# Patient Record
Sex: MALE | Race: WHITE | NOT HISPANIC OR LATINO | URBAN - METROPOLITAN AREA
[De-identification: names, ages, dates, MRNs, and addresses within clinical notes are randomized per-mention and may not be internally consistent; named-entity substitution may affect disease eponyms.]

---

## 2024-04-19 ENCOUNTER — HOSPITAL ENCOUNTER (OUTPATIENT)
Dept: CARDIOLOGY | Facility: HOSPITAL | Age: 54
Discharge: HOME | End: 2024-04-19
Payer: COMMERCIAL

## 2024-04-19 DIAGNOSIS — I35.0 AORTIC VALVE STENOSIS, ETIOLOGY OF CARDIAC VALVE DISEASE UNSPECIFIED: Primary | ICD-10-CM

## 2024-04-19 DIAGNOSIS — I35.0 AORTIC STENOSIS: ICD-10-CM

## 2024-04-19 PROCEDURE — 93325 DOPPLER ECHO COLOR FLOW MAPG: CPT | Mod: REDUCED SERVICES | Performed by: INTERNAL MEDICINE

## 2024-04-19 PROCEDURE — 76376 3D RENDER W/INTRP POSTPROCES: CPT | Mod: 52

## 2024-04-19 PROCEDURE — 93321 DOPPLER ECHO F-UP/LMTD STD: CPT | Mod: REDUCED SERVICES | Performed by: INTERNAL MEDICINE

## 2024-04-19 PROCEDURE — 93308 TTE F-UP OR LMTD: CPT | Mod: REDUCED SERVICES | Performed by: INTERNAL MEDICINE

## 2024-04-19 PROCEDURE — 93356 MYOCRD STRAIN IMG SPCKL TRCK: CPT | Mod: REDUCED SERVICES | Performed by: INTERNAL MEDICINE

## 2024-04-19 PROCEDURE — 76376 3D RENDER W/INTRP POSTPROCES: CPT | Mod: REDUCED SERVICES | Performed by: INTERNAL MEDICINE

## 2024-04-19 PROCEDURE — 2500000004 HC RX 250 GENERAL PHARMACY W/ HCPCS (ALT 636 FOR OP/ED): Performed by: INTERNAL MEDICINE

## 2024-04-19 RX ADMIN — PERFLUTREN 2 ML OF DILUTION: 6.52 INJECTION, SUSPENSION INTRAVENOUS at 12:36

## 2024-04-22 LAB
AORTIC VALVE MEAN GRADIENT: 31.5 MMHG
AORTIC VALVE PEAK VELOCITY: 3.7 M/S
AV PEAK GRADIENT: 54.6 MMHG
AVA (PEAK VEL): 1.14 CM2
AVA (VTI): 1.12 CM2
EJECTION FRACTION APICAL 4 CHAMBER: 57.8
LEFT VENTRICLE INTERNAL DIMENSION DIASTOLE: 5.8 CM (ref 3.5–6)
LEFT VENTRICULAR OUTFLOW TRACT DIAMETER: 1.99 CM
LV EJECTION FRACTION BIPLANE: 56 %
MITRAL VALVE E/A RATIO: 2.3
MITRAL VALVE E/E' RATIO: 16.24
RIGHT VENTRICLE FREE WALL PEAK S': 9 CM/S
RIGHT VENTRICLE PEAK SYSTOLIC PRESSURE: 43.8 MMHG
TRICUSPID ANNULAR PLANE SYSTOLIC EXCURSION: 1.6 CM

## 2024-05-03 ENCOUNTER — APPOINTMENT (OUTPATIENT)
Dept: INTERVENTIONAL RADIOLOGY/VASCULAR | Age: 54
End: 2024-05-03
Payer: COMMERCIAL

## 2024-05-03 ENCOUNTER — APPOINTMENT (OUTPATIENT)
Dept: GENERAL RADIOLOGY | Age: 54
End: 2024-05-03
Payer: COMMERCIAL

## 2024-05-03 ENCOUNTER — HOSPITAL ENCOUNTER (EMERGENCY)
Age: 54
Discharge: HOME | End: 2024-05-03
Attending: STUDENT IN AN ORGANIZED HEALTH CARE EDUCATION/TRAINING PROGRAM
Payer: COMMERCIAL

## 2024-05-03 VITALS
OXYGEN SATURATION: 98 % | SYSTOLIC BLOOD PRESSURE: 162 MMHG | TEMPERATURE: 98.4 F | DIASTOLIC BLOOD PRESSURE: 91 MMHG | HEART RATE: 84 BPM | RESPIRATION RATE: 18 BRPM

## 2024-05-03 DIAGNOSIS — R18.8 OTHER ASCITES: Primary | ICD-10-CM

## 2024-05-03 DIAGNOSIS — R06.02 SHORTNESS OF BREATH: ICD-10-CM

## 2024-05-03 LAB
ALBUMIN SERPL-MCNC: 3.5 G/DL (ref 3.5–5.2)
ALP SERPL-CCNC: 130 U/L (ref 40–129)
ALT SERPL-CCNC: 20 U/L (ref 0–40)
ANION GAP SERPL CALCULATED.3IONS-SCNC: 8 MMOL/L (ref 7–16)
AST SERPL-CCNC: 35 U/L (ref 0–39)
BASOPHILS # BLD: 0.03 K/UL (ref 0–0.2)
BASOPHILS NFR BLD: 1 % (ref 0–2)
BILIRUB DIRECT SERPL-MCNC: 0.3 MG/DL (ref 0–0.3)
BILIRUB INDIRECT SERPL-MCNC: 0.5 MG/DL (ref 0–1)
BILIRUB SERPL-MCNC: 0.8 MG/DL (ref 0–1.2)
BUN SERPL-MCNC: 24 MG/DL (ref 6–20)
CALCIUM SERPL-MCNC: 8.5 MG/DL (ref 8.6–10.2)
CHLORIDE SERPL-SCNC: 89 MMOL/L (ref 98–107)
CO2 SERPL-SCNC: 32 MMOL/L (ref 22–29)
CREAT SERPL-MCNC: 1 MG/DL (ref 0.7–1.2)
EOSINOPHIL # BLD: 0.18 K/UL (ref 0.05–0.5)
EOSINOPHILS RELATIVE PERCENT: 5 % (ref 0–6)
ERYTHROCYTE [DISTWIDTH] IN BLOOD BY AUTOMATED COUNT: 19.7 % (ref 11.5–15)
GFR, ESTIMATED: >90 ML/MIN/1.73M2
GLUCOSE SERPL-MCNC: 150 MG/DL (ref 74–99)
HCT VFR BLD AUTO: 26 % (ref 37–54)
HGB BLD-MCNC: 7.7 G/DL (ref 12.5–16.5)
IMM GRANULOCYTES # BLD AUTO: <0.03 K/UL (ref 0–0.58)
IMM GRANULOCYTES NFR BLD: 0 % (ref 0–5)
INR PPP: 1.5
LYMPHOCYTES NFR BLD: 0.63 K/UL (ref 1.5–4)
LYMPHOCYTES RELATIVE PERCENT: 17 % (ref 20–42)
MAGNESIUM SERPL-MCNC: 2.2 MG/DL (ref 1.6–2.6)
MCH RBC QN AUTO: 27 PG (ref 26–35)
MCHC RBC AUTO-ENTMCNC: 29.6 G/DL (ref 32–34.5)
MCV RBC AUTO: 91.2 FL (ref 80–99.9)
MONOCYTES NFR BLD: 0.45 K/UL (ref 0.1–0.95)
MONOCYTES NFR BLD: 12 % (ref 2–12)
NEUTROPHILS NFR BLD: 66 % (ref 43–80)
NEUTS SEG NFR BLD: 2.48 K/UL (ref 1.8–7.3)
PLATELET # BLD AUTO: 98 K/UL (ref 130–450)
PLATELET CONFIRMATION: NORMAL
PMV BLD AUTO: 11 FL (ref 7–12)
POTASSIUM SERPL-SCNC: 4.6 MMOL/L (ref 3.5–5)
PROT SERPL-MCNC: 7.8 G/DL (ref 6.4–8.3)
PROTHROMBIN TIME: 16.7 SEC (ref 9.3–12.4)
RBC # BLD AUTO: 2.85 M/UL (ref 3.8–5.8)
SODIUM SERPL-SCNC: 129 MMOL/L (ref 132–146)
TROPONIN I SERPL HS-MCNC: 23 NG/L (ref 0–11)
TROPONIN I SERPL HS-MCNC: 26 NG/L (ref 0–11)
WBC OTHER # BLD: 3.8 K/UL (ref 4.5–11.5)

## 2024-05-03 PROCEDURE — 80053 COMPREHEN METABOLIC PANEL: CPT

## 2024-05-03 PROCEDURE — C1729 CATH, DRAINAGE: HCPCS

## 2024-05-03 PROCEDURE — 99285 EMERGENCY DEPT VISIT HI MDM: CPT

## 2024-05-03 PROCEDURE — 84484 ASSAY OF TROPONIN QUANT: CPT

## 2024-05-03 PROCEDURE — 83735 ASSAY OF MAGNESIUM: CPT

## 2024-05-03 PROCEDURE — 85025 COMPLETE CBC W/AUTO DIFF WBC: CPT

## 2024-05-03 PROCEDURE — 85610 PROTHROMBIN TIME: CPT

## 2024-05-03 PROCEDURE — 71045 X-RAY EXAM CHEST 1 VIEW: CPT

## 2024-05-03 PROCEDURE — 82248 BILIRUBIN DIRECT: CPT

## 2024-05-03 PROCEDURE — 49083 ABD PARACENTESIS W/IMAGING: CPT

## 2024-05-03 PROCEDURE — 93005 ELECTROCARDIOGRAM TRACING: CPT | Performed by: STUDENT IN AN ORGANIZED HEALTH CARE EDUCATION/TRAINING PROGRAM

## 2024-05-03 NOTE — PROGRESS NOTES
Patient came down to special procedures for ultrasound guided paracentesis.      Instructions given and questions answered. Consent signed.     Abdomen scanned and marked under the guidance of procedural Radiologist.     1522 start procedure /66 84 18 97% on 2LPM/nasal cannula.    1537 end procedure /66 86 18 98% on 2LPM/nasal cannula.     3650 cc drained of clear yellow colored ascites fluid.     Dermabond glue applied to catheter insertion site.       Dry sterile dressing of folded 4 x 4 telfa to RLQ.     Patient DSD can be removed in 24 hours.    Specimen sent to ER with patient.    Nurse to nurse called, spoke with Zenia RN, nurse notified of above information.    Patient transported back to the emergency room.

## 2024-05-03 NOTE — ED PROVIDER NOTES
deformity.      Cervical back: Normal range of motion and neck supple.   Skin:     General: Skin is warm and dry.   Neurological:      Mental Status: He is alert and oriented to person, place, and time.      Cranial Nerves: No cranial nerve deficit.      Coordination: Coordination normal.          Procedures       [facility]       ED Course as of 05/03/24 1744   Fri May 03, 2024   1355 EKG read interpreted by me.  Rate 90 bpm.  Normal axis.  Normal HI interval.  No ST elevations or depressions.  Nonspecific T wave abnormality.  No prior EKG to compare to [JM]   1627 Patient went to IR for IR drainage.  Patient stated that after getting drain he had significant improvement of his shortness of breath.  Patient otherwise clinically appears well and is requesting go home. [JM]      ED Course User Index  [MATT] Tariq Sheets MD      Patient is a 54 y.o. male presenting with abdominal distention  Patient's differential includes but is not limited to ascites, CHF, pneumonia  Tests reviewed: Patient was just admitted to trauma and was discharged home.  Patient stated that he saw his primary care provider he stated that he was stressed and Ransom for the same issue and was discharged without getting a paracentesis and stated that he saw his primary care provider told him to come into this ER to receive paracentesis.  Patient stated that his symptoms are just related to distention of his abdomen.  Patient again EKG with no acute findings.  Patient's troponins are stable.  BMP is otherwise at baseline.  Patient's CBC shows a low hemoglobin but patient is having no active bleeding.  Patient did have a paracentesis performed.  Patient stated that his symptoms resolved.  Patient stated that he feels well enough to go home.  Patient notes that he previously saw his cardiologist who told him that this is just related to his situs.  Patient will be discharged.  Discussed further workup with patient but patient stated that he is just  here for the ascites and needing to have his bili drain.  Patient has no abdominal pain but is distention.          Patient was given return precautions.  Patient will follow up with their primary care provider. Patient is agreeable to this plan. Patient has remained stable throughout their stay in the ED.             CONSULTS:   None    Medications given in the emergency room:  Medications - No data to display     LABS:    Labs Reviewed   CBC WITH AUTO DIFFERENTIAL   MAGNESIUM   PROTIME-INR   BASIC METABOLIC PANEL   HEPATIC FUNCTION PANEL   TROPONIN       As interpreted by me, the above displayed labs are abnormal. All other labs obtained during this visit were within normal range or not returned as of this dictation.    RADIOLOGY:   Non-plain film images such as CT, Ultrasound and MRI are read by the radiologist. Plain radiographic images are visualized and preliminarily interpreted by the ED Provider with the below findings:      Interpretation per the Radiologist below, if available at the time of this note:    XR CHEST PORTABLE    (Results Pending)   CT ABDOMEN PELVIS W IV CONTRAST Additional Contrast? None    (Results Pending)     No results found.    No results found.  Tariq Sheets MD            ED Course as of 05/03/24 1801   Fri May 03, 2024   1355 EKG read interpreted by me.  Rate 90 bpm.  Normal axis.  Normal [place] interval.  No ST elevations or depressions.  Nonspecific T wave abnormality.  No prior EKG to compare to [MATT]   9418 Patient went to IR for IR drainage.  Patient stated that after getting drain he had significant improvement of his shortness of breath.  Patient otherwise clinically appears well and is requesting go home. [MATT]      ED Course User Index  [MATT] Tariq Sheets MD       --------------------------------------------- PAST HISTORY ---------------------------------------------  Past Medical History:  has no past medical history on file.    Past Surgical History:  has no past surgical

## 2024-05-04 LAB
EKG ATRIAL RATE: 90 BPM
EKG P AXIS: 55 DEGREES
EKG P-R INTERVAL: 188 MS
EKG Q-T INTERVAL: 366 MS
EKG QRS DURATION: 94 MS
EKG QTC CALCULATION (BAZETT): 447 MS
EKG R AXIS: 68 DEGREES
EKG T AXIS: 40 DEGREES
EKG VENTRICULAR RATE: 90 BPM

## 2024-05-04 PROCEDURE — 93010 ELECTROCARDIOGRAM REPORT: CPT | Performed by: INTERNAL MEDICINE

## 2024-05-24 ENCOUNTER — HOSPITAL ENCOUNTER (EMERGENCY)
Age: 54
Discharge: HOME | End: 2024-05-25
Attending: STUDENT IN AN ORGANIZED HEALTH CARE EDUCATION/TRAINING PROGRAM
Payer: COMMERCIAL

## 2024-05-24 ENCOUNTER — APPOINTMENT (OUTPATIENT)
Dept: GENERAL RADIOLOGY | Age: 54
End: 2024-05-24
Payer: COMMERCIAL

## 2024-05-24 VITALS
TEMPERATURE: 97.9 F | HEART RATE: 84 BPM | OXYGEN SATURATION: 94 % | SYSTOLIC BLOOD PRESSURE: 130 MMHG | DIASTOLIC BLOOD PRESSURE: 74 MMHG | RESPIRATION RATE: 18 BRPM

## 2024-05-24 DIAGNOSIS — M25.552 LEFT HIP PAIN: Primary | ICD-10-CM

## 2024-05-24 PROCEDURE — 99283 EMERGENCY DEPT VISIT LOW MDM: CPT

## 2024-05-24 PROCEDURE — 73502 X-RAY EXAM HIP UNI 2-3 VIEWS: CPT

## 2024-05-24 PROCEDURE — 73562 X-RAY EXAM OF KNEE 3: CPT

## 2024-05-24 PROCEDURE — 73552 X-RAY EXAM OF FEMUR 2/>: CPT

## 2024-05-24 ASSESSMENT — PAIN SCALES - GENERAL: PAINLEVEL_OUTOF10: 9

## 2024-05-24 ASSESSMENT — PAIN DESCRIPTION - ORIENTATION: ORIENTATION: LEFT

## 2024-05-24 ASSESSMENT — PAIN DESCRIPTION - LOCATION: LOCATION: HIP

## 2024-05-24 ASSESSMENT — PAIN - FUNCTIONAL ASSESSMENT: PAIN_FUNCTIONAL_ASSESSMENT: 0-10

## 2024-05-25 RX ORDER — CYCLOBENZAPRINE HCL 10 MG
10 TABLET ORAL 3 TIMES DAILY PRN
Qty: 21 TABLET | Refills: 0 | Status: SHIPPED | OUTPATIENT
Start: 2024-05-25 | End: 2024-06-04

## 2024-05-25 NOTE — DISCHARGE INSTR - COC
{GREATER/LESS:[id number]} 30 days.     Update Admission H&P: {CHP DME Changes in HandP:690206682}    PHYSICIAN SIGNATURE:  {Esignature:[id number]}

## 2024-05-25 NOTE — ED PROVIDER NOTES
[facility] EMERGENCY DEPARTMENT  EMERGENCY DEPARTMENT ENCOUNTER        Pt Name: Andriy Adams  MRN: [id number]  Birthdate 1970  Date of evaluation: 5/24/2024  Provider: Susana Ann MD  PCP: Anthony Carver DO  Note Started: 11:09 PM EDT 5/24/24    CHIEF COMPLAINT       Chief Complaint   Patient presents with    Fall     Twisted left leg about  5 days ago and has been experiencing left hip pain    Hip Pain     Left       HISTORY OF PRESENT ILLNESS: 1 or more Elements   History From: Patient    Limitations to history : None    Andriy Adams is a 54 y.o. male who presents for left hip pain radiating towards left knee.  Patient states he was getting off of his lawnmower about 1 week ago.  He states he had his left foot planted and twisted his body.  He did experience some left hip pain that has persisted for the past week.  He is still able to ambulate but has some difficulty due to pain.  He did take 600 mg ibuprofen as well as Tylenol prior to arrival with improvement of his pain.  He has no other associated symptoms such as abdominal pain, chest pain, shortness of breath.  He did not fall to the ground or hit his head.  He is on 2 L of oxygen at baseline not requiring more today    Nursing Notes were all reviewed and agreed with or any disagreements were addressed in the HPI.        REVIEW OF EXTERNAL NOTE :       Patient was seen in the hospital on 5/3/2024 for ascites and shortness of breath.  He does have upcoming appointment with GI for an EGD on 5/30/2024    REVIEW OF SYSTEMS :           Positives and Pertinent negatives as per HPI.     SURGICAL HISTORY   No past surgical history on file.    CURRENTMEDICATIONS       Previous Medications    ALBUTEROL SULFATE HFA (VENTOLIN HFA) 108 (90 BASE) MCG/ACT INHALER    Inhale 2 puffs into the lungs every 6 hours as needed for Wheezing    ATORVASTATIN (LIPITOR) 20 MG TABLET    Take 1 tablet by mouth daily

## 2024-05-28 NOTE — PROGRESS NOTES
[facility]                                                                                                                    PRE OP INSTRUCTIONS FOR  Andriy Adams        Date: 5/28/2024    Date of surgery: 5/30   Arrival Time: Hospital will call you between 5pm and 7pm with your final arrival time for surgery. Go to     front of hospital and check in at information desk.    Nothing by mouth (NPO) as instructed. May have clear liquids up to 2 hours prior to surgery. Nothing solid after midnight. Examples: water, apple juice, black coffee, plain tea    Take the following medications with a small sip of water on the morning of Surgery: celexa, coreg, inhalers     Diabetics may take half the evening dose of insulin but none after midnight.  If you feel symptomatic or have low blood sugar morning of surgery drink 1-2 ounces of apple juice only. If you take a weekly insulin injection _______________, stop 7 days prior to surgery. If you take _______________, stop 3-4 days prior to surgery.    Aspirin, Ibuprofen, Advil, Naproxen, other Anti-inflammatory products should be stopped before surgery as directed by your surgeon, cardiologist, or primary care Doctor. Herbal supplements and Vitamin E should be stopped five days prior.  May take Tylenol unless instructed otherwise by your surgeon.    Check with your Doctor regarding stopping Plavix, Coumadin, Lovenox, Eliquis, Effient, or other blood thinners such as, pradaxa, lixiana, xaralto and savaysa.    Do not smoke, vape, or use illicit drugs and do not drink any alcoholic beverages 24 hours prior to surgery.    You may brush your teeth the morning of surgery.      You MUST make arrangements for a responsible adult, 18 and over, to take you home after your surgery. You will not be allowed to leave alone or drive yourself home.  You will need someone stay with you the first 24 hrs. Your surgery will be cancelled if you do not have a ride home or

## 2024-05-30 ENCOUNTER — HOSPITAL ENCOUNTER (OUTPATIENT)
Age: 54
Setting detail: OUTPATIENT SURGERY
Discharge: HOME | End: 2024-05-30
Attending: INTERNAL MEDICINE | Admitting: INTERNAL MEDICINE
Payer: COMMERCIAL

## 2024-05-30 ENCOUNTER — ANESTHESIA EVENT (OUTPATIENT)
Dept: ENDOSCOPY | Age: 54
End: 2024-05-30
Payer: COMMERCIAL

## 2024-05-30 ENCOUNTER — ANESTHESIA (OUTPATIENT)
Dept: ENDOSCOPY | Age: 54
End: 2024-05-30
Payer: COMMERCIAL

## 2024-05-30 VITALS
RESPIRATION RATE: 16 BRPM | TEMPERATURE: 97.8 F | HEART RATE: 97 BPM | WEIGHT: 291 LBS | HEIGHT: 71 IN | BODY MASS INDEX: 40.74 KG/M2 | DIASTOLIC BLOOD PRESSURE: 78 MMHG | OXYGEN SATURATION: 98 % | SYSTOLIC BLOOD PRESSURE: 148 MMHG

## 2024-05-30 DIAGNOSIS — R18.8 OTHER ASCITES: ICD-10-CM

## 2024-05-30 DIAGNOSIS — R12 HEARTBURN: ICD-10-CM

## 2024-05-30 DIAGNOSIS — D64.9 ANEMIA, UNSPECIFIED TYPE: ICD-10-CM

## 2024-05-30 LAB — GLUCOSE BLD-MCNC: 154 MG/DL (ref 74–99)

## 2024-05-30 PROCEDURE — 2500000003 HC RX 250 WO HCPCS: Performed by: NURSE ANESTHETIST, CERTIFIED REGISTERED

## 2024-05-30 PROCEDURE — 88342 IMHCHEM/IMCYTCHM 1ST ANTB: CPT

## 2024-05-30 PROCEDURE — 6360000002 HC RX W HCPCS: Performed by: INTERNAL MEDICINE

## 2024-05-30 PROCEDURE — 2580000003 HC RX 258: Performed by: ANESTHESIOLOGY

## 2024-05-30 PROCEDURE — 2709999900 HC NON-CHARGEABLE SUPPLY: Performed by: INTERNAL MEDICINE

## 2024-05-30 PROCEDURE — 7100000011 HC PHASE II RECOVERY - ADDTL 15 MIN: Performed by: INTERNAL MEDICINE

## 2024-05-30 PROCEDURE — 2720000010 HC SURG SUPPLY STERILE: Performed by: INTERNAL MEDICINE

## 2024-05-30 PROCEDURE — 7100000010 HC PHASE II RECOVERY - FIRST 15 MIN: Performed by: INTERNAL MEDICINE

## 2024-05-30 PROCEDURE — 3609012300 HC EGD BAND LIGATION ESOPHGEAL/GASTRIC VARICES: Performed by: INTERNAL MEDICINE

## 2024-05-30 PROCEDURE — 3609012400 HC EGD TRANSORAL BIOPSY SINGLE/MULTIPLE: Performed by: INTERNAL MEDICINE

## 2024-05-30 PROCEDURE — 6360000002 HC RX W HCPCS: Performed by: NURSE ANESTHETIST, CERTIFIED REGISTERED

## 2024-05-30 PROCEDURE — 88305 TISSUE EXAM BY PATHOLOGIST: CPT

## 2024-05-30 PROCEDURE — 82962 GLUCOSE BLOOD TEST: CPT

## 2024-05-30 PROCEDURE — 2580000003 HC RX 258: Performed by: INTERNAL MEDICINE

## 2024-05-30 PROCEDURE — 3700000001 HC ADD 15 MINUTES (ANESTHESIA): Performed by: INTERNAL MEDICINE

## 2024-05-30 PROCEDURE — 3700000000 HC ANESTHESIA ATTENDED CARE: Performed by: INTERNAL MEDICINE

## 2024-05-30 RX ORDER — SODIUM CHLORIDE 9 MG/ML
INJECTION, SOLUTION INTRAVENOUS CONTINUOUS
Status: DISCONTINUED | OUTPATIENT
Start: 2024-05-30 | End: 2024-05-30 | Stop reason: HOSPADM

## 2024-05-30 RX ORDER — PROPOFOL 10 MG/ML
INJECTION, EMULSION INTRAVENOUS PRN
Status: DISCONTINUED | OUTPATIENT
Start: 2024-05-30 | End: 2024-05-30 | Stop reason: SDUPTHER

## 2024-05-30 RX ORDER — LIDOCAINE HYDROCHLORIDE 20 MG/ML
INJECTION, SOLUTION INFILTRATION; PERINEURAL PRN
Status: DISCONTINUED | OUTPATIENT
Start: 2024-05-30 | End: 2024-05-30 | Stop reason: SDUPTHER

## 2024-05-30 RX ADMIN — AMPICILLIN SODIUM 2000 MG: 2 INJECTION, POWDER, FOR SOLUTION INTRAMUSCULAR; INTRAVENOUS at 07:27

## 2024-05-30 RX ADMIN — SODIUM CHLORIDE: 9 INJECTION, SOLUTION INTRAVENOUS at 06:30

## 2024-05-30 RX ADMIN — PROPOFOL 350 MG: 10 INJECTION, EMULSION INTRAVENOUS at 07:25

## 2024-05-30 RX ADMIN — LIDOCAINE HYDROCHLORIDE 100 MG: 20 INJECTION, SOLUTION INFILTRATION; PERINEURAL at 07:25

## 2024-05-30 ASSESSMENT — PAIN SCALES - GENERAL
PAINLEVEL_OUTOF10: 0
PAINLEVEL_OUTOF10: 5

## 2024-05-30 NOTE — H&P
Chief complaint: Anemia.    54-year-old male patient, history of anemia.  He cirrhosis and portal hypertension.    Allergies: None.    Habits: Denies at this time.    Current medications: Hydrochlorothiazide sitagliptin, Jardiance, albuterol inhaler, carvedilol, budesonide inhaler, metolazone, Lasix, citalopram, atorvastatin, lisinopril.    Medical history: Hypertension, diabetes, cirrhosis with portal hypertension, ascites.    Past surgeries: None.    Family history: Noncontributory for this particular problem.    Review of system: Noncontributory for this particular problem.    Exam: Alert and oriented.  /80.  Pulse 72 regular.  Respirations 16.  Temperature 97.1.    Oral cavity negative.  Neck negative.  The peripheral lymph nodes are not palpable.  Lungs clear to auscultation.  Heart tones regular.  Abdomen, ascites.  Rectal and genitalia deferred.  Extremities pedal edema.  Alert & oriented x 3, CN 2-12 normal, normal motor function, normal sensory function, no focal deficits noted no gross deficits.    Impression: Anemia, cirrhosis.    Plan: EGD.

## 2024-05-30 NOTE — OP NOTE
Operative Note      Patient: Andriy Adams  YOB: 1970  MRN: [id number]    Date of Procedure: 5/30/2024    Pre-Op Diagnosis Codes:     * Anemia, unspecified type [D64.9]     * Other ascites [R18.8]     * Heartburn [R12]    Post-Op Diagnosis: Esophageal varices, multiple duodenal ulcers.       Procedure(s):  ESOPHAGOGASTRODUODENOSCOPY    Surgeon(s):  Ubaldo Narvaez MD    Assistant:   * No surgical staff found *    Anesthesia: Monitor Anesthesia Care    Estimated Blood Loss (mL): None    Complications: None    Specimens:   * No specimens in log *    Implants:  * No implants in log *      Drains: * No LDAs found *    Findings:  Infection Present At Time Of Surgery (PATOS) (choose all levels that have infection present):  None  Other Findings: None    Detailed Description of Procedure:   54-year-old male patient, with anemia.  History of cirrhosis and portal hypertension.    The upper endoscope was introduced through the mouth.  The esophagus, stomach and proximal duodenum were inspected.  Exam of the gastric fundus by retroflexion.  The stomach distends well with air insufflation.  Large esophageal varices, 3 bands applied.  Multiple small duodenal ulcers.  Biopsy gastric antrum and duodenum.  Exam of the upper esophagus and hypopharynx during withdrawal.    Electronically signed by Ubaldo Narvaez MD on 5/30/2024 at 7:12 AM

## 2024-05-30 NOTE — DISCHARGE INSTRUCTIONS
Variceal Banding: What to Expect at Home  Your Recovery     Your doctor used a lighted tube (endoscope, or scope) to help fix one or more enlarged veins in your esophagus. That's the tube that carries food from your mouth to your stomach. The enlarged veins are called varices.  The doctor placed elastic rings around the veins. The rings look like rubber bands.  The bands cut off blood flow through the vein. They help prevent internal bleeding.  You may need to have the procedure several times to control the varices and prevent bleeding. Your doctor will check the varices regularly.  This care sheet gives you a general idea about how long it will take for you to recover. But each person recovers at a different pace. Follow the steps below to get better as quickly as possible.  How can you care for yourself at home?  Activity    Rest when you feel tired. Getting enough sleep will help you recover.     Avoid strenuous activities, such as bicycle riding, jogging, weight lifting, or aerobic exercise, until your doctor says it is okay.     Allow your body to heal. Don't move quickly or lift anything heavy until you are feeling better.     You may need to take a day or two off work. It depends on the type of work you do and how you feel.     For your safety, do not drive or operate any machinery that could be dangerous. Wait until the medicine wears off and you can think clearly and react easily.     Your doctor may suggest sleeping with more pillows under your head and shoulders so you aren't lying flat.   Diet    Do not eat or drink for 2 hours after your procedure.     Drink plenty of fluids (unless your doctor has told you not to).     Try just liquids for your first meal after the surgery. Then you can have regular food if it feels okay. You might try soft foods until your throat feels better.     Do not drink alcohol. It increases your risk of bleeding. It can also make liver damage worse. Tell your doctor if

## 2024-05-30 NOTE — ANESTHESIA PRE PROCEDURE
Department of Anesthesiology  Preprocedure Note       Name:  Andriy Adams   Age:  54 y.o.  :  1970                                          MRN:  [id number]         Date:  2024      Surgeon: Surgeon(s):  Ubaldo Narvaez MD    Procedure: Procedure(s):  ESOPHAGOGASTRODUODENOSCOPY    Medications prior to admission:   Prior to Admission medications    Medication Sig Start Date End Date Taking? Authorizing Provider   OXYGEN Inhale 2 L into the lungs daily   Yes Ana Cristina Bowman MD   cyclobenzaprine (FLEXERIL) 10 MG tablet Take 1 tablet by mouth 3 times daily as needed for Muscle spasms 24  Susana Ann MD   sitaGLIPtan-metFORMIN (JANUMET)  MG per tablet Take 1 tablet by mouth 2 times daily (with meals)    Ana Cristina Bowman MD   carvedilol (COREG) 3.125 MG tablet Take 1 tablet by mouth 2 times daily (with meals)    Ana Cristina Bowman MD   atorvastatin (LIPITOR) 20 MG tablet Take 1 tablet by mouth daily    Ana Cristina Bowman MD   tiotropium (SPIRIVA) 18 MCG inhalation capsule Inhale 1 capsule into the lungs daily    Ana Cristina Bowman MD   albuterol sulfate HFA (VENTOLIN HFA) 108 (90 Base) MCG/ACT inhaler Inhale 2 puffs into the lungs every 6 hours as needed for Wheezing    Ana Cristina Bowman MD   citalopram (CELEXA) 20 MG tablet Take 1 tablet by mouth daily    Ana Cristina Bowman MD   budesonide-formoterol (SYMBICORT) 80-4.5 MCG/ACT AERO Inhale 2 puffs into the lungs 2 times daily    Ana Cristina Bowman MD   lisinopril (PRINIVIL;ZESTRIL) 20 MG tablet Take 1 tablet by mouth daily    Ana Cristina Bowman MD   metOLazone (ZAROXOLYN) 2.5 MG tablet Take 1 tablet by mouth daily    Ana Cristina Bowman MD   hydroCHLOROthiazide 12.5 MG capsule Take 1 capsule by mouth daily    Ana Cristina Bowman MD   furosemide (LASIX) 40 MG tablet Take 1 tablet by mouth daily    Ana Cristina Bowman MD   empagliflozin (JARDIANCE) 10 MG tablet Take 1 tablet by mouth daily

## 2024-05-30 NOTE — ANESTHESIA POSTPROCEDURE EVALUATION
Department of Anesthesiology  Postprocedure Note    Patient: Andriy Adams  MRN: [id number]  YOB: 1970  Date of evaluation: 5/30/2024    Procedure Summary       Date: 05/30/24 Room / Location: [place] [id number] / [facility]    Anesthesia Start: 0722 Anesthesia Stop: 0743    Procedures:       ESOPHAGOGASTRODUODENOSCOPY BIOPSY      ESOPHAGOGASTRODUODENOSCOPY BAND LIGATION Diagnosis:       Anemia, unspecified type      Other ascites      Heartburn      (Anemia, unspecified type [D64.9])      (Other ascites [R18.8])      (Heartburn [R12])    Surgeons: Ubaldo Narvaez MD Responsible Provider: Jason Gerardo MD    Anesthesia Type: MAC ASA Status: 3            Anesthesia Type: No value filed.    Brenden Phase I: Brenden Score: 10    Brenden Phase II:      Anesthesia Post Evaluation    Patient location during evaluation: PACU  Patient participation: complete - patient participated  Level of consciousness: awake and alert  Airway patency: patent  Nausea & Vomiting: no nausea and no vomiting  Cardiovascular status: hemodynamically stable  Respiratory status: acceptable  Hydration status: euvolemic  Pain management: satisfactory to patient    No notable events documented.

## 2024-06-07 LAB — SURGICAL PATHOLOGY REPORT: NORMAL

## (undated) DEVICE — Device: Brand: DEFENDO VALVE AND CONNECTOR KIT

## (undated) DEVICE — 4-PORT MANIFOLD: Brand: NEPTUNE 2

## (undated) DEVICE — MASK,FACE,MAXFLUIDPROTECT,SHIELD/ERLPS: Brand: MEDLINE

## (undated) DEVICE — LIGATOR ENDOSCP DIA8.6-11.5MM MULT DISP SPDBND LIGATOR SUP

## (undated) DEVICE — CONTAINER SPEC COLL 960ML POLYPR TRIANG GRAD INTAKE/OUTPUT

## (undated) DEVICE — FORCEPS BX L240CM JAW DIA2.8MM L CAP W/ NDL MIC MESH TOOTH

## (undated) DEVICE — BLOCK BITE 60FR CAREGUARD

## (undated) DEVICE — BAG SPECIMEN BIOHAZARD 6IN X 9IN

## (undated) DEVICE — SPONGE GZ 4IN 4IN 4 PLY N WVN AVANT

## (undated) DEVICE — TUBING, SUCTION, 1/4" X 10', STRAIGHT: Brand: MEDLINE

## (undated) DEVICE — WIPES SKIN CLOTH READYPREP 9 X 10.5 IN 2% CHLORHEX GLUCONATE CHG PREOP

## (undated) DEVICE — KENDALL 450 SERIES MONITORING FOAM ELECTRODE - RECTANGULAR SHAPE ( 3/PK): Brand: KENDALL

## (undated) DEVICE — GOWN ISOLATN XL BLU POLYPR OVR HD OPN BK KNIT CUF PROTCT

## (undated) DEVICE — KIT BEDSIDE REVITAL OX 500ML

## (undated) DEVICE — AIR/WATER CLEANING ADAPTER FOR OLYMPUS® GI ENDOSCOPE: Brand: BULLDOG®

## (undated) DEVICE — YANKAUER,BULB TIP,W/O VENT,RIGID,STERILE: Brand: MEDLINE

## (undated) DEVICE — JELLY,LUBE,STERILE,FLIP TOP,TUBE,4-OZ: Brand: MEDLINE